# Patient Record
Sex: MALE | Race: WHITE | NOT HISPANIC OR LATINO
[De-identification: names, ages, dates, MRNs, and addresses within clinical notes are randomized per-mention and may not be internally consistent; named-entity substitution may affect disease eponyms.]

---

## 2023-12-26 ENCOUNTER — NON-APPOINTMENT (OUTPATIENT)
Age: 43
End: 2023-12-26

## 2024-01-24 ENCOUNTER — NON-APPOINTMENT (OUTPATIENT)
Age: 44
End: 2024-01-24

## 2024-01-24 ENCOUNTER — APPOINTMENT (OUTPATIENT)
Dept: INTERNAL MEDICINE | Facility: CLINIC | Age: 44
End: 2024-01-24
Payer: COMMERCIAL

## 2024-01-24 VITALS
SYSTOLIC BLOOD PRESSURE: 110 MMHG | RESPIRATION RATE: 15 BRPM | WEIGHT: 172 LBS | DIASTOLIC BLOOD PRESSURE: 70 MMHG | HEIGHT: 66 IN | OXYGEN SATURATION: 98 % | TEMPERATURE: 96.6 F | HEART RATE: 70 BPM | BODY MASS INDEX: 27.64 KG/M2

## 2024-01-24 DIAGNOSIS — Z82.49 FAMILY HISTORY OF ISCHEMIC HEART DISEASE AND OTHER DISEASES OF THE CIRCULATORY SYSTEM: ICD-10-CM

## 2024-01-24 DIAGNOSIS — Z00.00 ENCOUNTER FOR GENERAL ADULT MEDICAL EXAMINATION W/OUT ABNORMAL FINDINGS: ICD-10-CM

## 2024-01-24 DIAGNOSIS — Z23 ENCOUNTER FOR IMMUNIZATION: ICD-10-CM

## 2024-01-24 DIAGNOSIS — F17.210 NICOTINE DEPENDENCE, CIGARETTES, UNCOMPLICATED: ICD-10-CM

## 2024-01-24 DIAGNOSIS — R10.13 EPIGASTRIC PAIN: ICD-10-CM

## 2024-01-24 DIAGNOSIS — R07.89 OTHER CHEST PAIN: ICD-10-CM

## 2024-01-24 PROCEDURE — 93000 ELECTROCARDIOGRAM COMPLETE: CPT | Mod: 59

## 2024-01-24 PROCEDURE — 99204 OFFICE O/P NEW MOD 45 MIN: CPT | Mod: 25

## 2024-01-24 RX ORDER — FAMOTIDINE 40 MG/1
40 TABLET, FILM COATED ORAL
Qty: 90 | Refills: 3 | Status: ACTIVE | COMMUNITY
Start: 2024-01-24 | End: 1900-01-01

## 2024-01-24 NOTE — HEALTH RISK ASSESSMENT
[Excellent] : ~his/her~ current health as excellent [Yes] : Yes [Monthly or less (1 pt)] : Monthly or less (1 point) [1 or 2 (0 pts)] : 1 or 2 (0 points) [No] : In the past 12 months have you used drugs other than those required for medical reasons? No [Never (0 pts)] : Never (0 points) [No falls in past year] : Patient reported no falls in the past year [0] : 2) Feeling down, depressed, or hopeless: Not at all (0) [PHQ-2 Negative - No further assessment needed] : PHQ-2 Negative - No further assessment needed [de-identified] :  walks [Audit-CScore] : 1 [de-identified] : good [HIV test declined] : HIV test declined [Change in mental status noted] : No change in mental status noted [Hepatitis C test declined] : Hepatitis C test declined [Language] : denies difficulty with language [Behavior] : denies difficulty with behavior [Learning/Retaining New Information] : denies difficulty learning/retaining new information [Handling Complex Tasks] : denies difficulty handling complex tasks [Reasoning] : denies difficulty with reasoning [Spatial Ability and Orientation] : denies difficulty with spatial ability and orientation [None] : None [Alone] : lives alone [Employed] : employed [] :  [FreeTextEntry2] : [employer] [Current] : Current [de-identified] : 1/2 - ppd

## 2024-01-24 NOTE — HISTORY OF PRESENT ILLNESS
[FreeTextEntry1] : new pt visit [de-identified] : no health issues remote appy 30+yeara ago  occ chest pain  recent epigastric pain "blockage" bloating, "like a rock sitting in there(pointing to epigastric area) : wife and mother and law had similar diarrhea, nausea

## 2024-01-25 ENCOUNTER — APPOINTMENT (OUTPATIENT)
Dept: CARDIOLOGY | Facility: CLINIC | Age: 44
End: 2024-01-25
Payer: COMMERCIAL

## 2024-01-25 ENCOUNTER — NON-APPOINTMENT (OUTPATIENT)
Age: 44
End: 2024-01-25

## 2024-01-25 ENCOUNTER — APPOINTMENT (OUTPATIENT)
Dept: CARDIOLOGY | Facility: CLINIC | Age: 44
End: 2024-01-25

## 2024-01-25 VITALS
BODY MASS INDEX: 27.48 KG/M2 | HEART RATE: 62 BPM | WEIGHT: 171 LBS | OXYGEN SATURATION: 98 % | DIASTOLIC BLOOD PRESSURE: 74 MMHG | SYSTOLIC BLOOD PRESSURE: 118 MMHG | HEIGHT: 66 IN

## 2024-01-25 DIAGNOSIS — E78.5 HYPERLIPIDEMIA, UNSPECIFIED: ICD-10-CM

## 2024-01-25 PROCEDURE — 93000 ELECTROCARDIOGRAM COMPLETE: CPT

## 2024-01-25 PROCEDURE — 99204 OFFICE O/P NEW MOD 45 MIN: CPT | Mod: 25

## 2024-01-25 RX ORDER — ATORVASTATIN CALCIUM 10 MG/1
10 TABLET, FILM COATED ORAL
Qty: 30 | Refills: 2 | Status: ACTIVE | COMMUNITY
Start: 2024-01-25 | End: 1900-01-01

## 2024-01-28 NOTE — REASON FOR VISIT
[CV Risk Factors and Non-Cardiac Disease] : CV risk factors and non-cardiac disease [Coronary Artery Disease] : coronary artery disease [FreeTextEntry3] : dr reyes [FreeTextEntry1] :  Brendan Stock comes today for evaluation. He was advised to undergo a complete cardiac evaluation. He denies shortness of breath or loss of consciousness but has noted chest pains. Brendan would like to understand his personal cardiovascular risk. He has risk in the form of family history and smoking. He underwent an EKG and was instructed undergo a cardiac evaluation and even a stress test. 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 his mother underwent open-heart surgery his uncle had heart failure in his 50s and his brother has heart problems as well.

## 2024-01-28 NOTE — PHYSICAL EXAM
[Well Developed] : well developed [Well Nourished] : well nourished [No Acute Distress] : no acute distress [Normal Conjunctiva] : normal conjunctiva [Normal Venous Pressure] : normal venous pressure [No Carotid Bruit] : no carotid bruit [Normal S1, S2] : normal S1, S2 [No Murmur] : no murmur [No Rub] : no rub [No Gallop] : no gallop [Clear Lung Fields] : clear lung fields [Good Air Entry] : good air entry [No Respiratory Distress] : no respiratory distress  [Soft] : abdomen soft [Non Tender] : non-tender [No Masses/organomegaly] : no masses/organomegaly [Normal Bowel Sounds] : normal bowel sounds [Normal Gait] : normal gait [No Edema] : no edema [No Cyanosis] : no cyanosis [No Clubbing] : no clubbing [No Varicosities] : no varicosities [No Rash] : no rash [Moves all extremities] : moves all extremities [No Skin Lesions] : no skin lesions [No Focal Deficits] : no focal deficits [Normal Speech] : normal speech [Alert and Oriented] : alert and oriented [Normal memory] : normal memory Infliximab Counseling:  I discussed with the patient the risks of infliximab including but not limited to myelosuppression, immunosuppression, autoimmune hepatitis, demyelinating diseases, lymphoma, and serious infections.  The patient understands that monitoring is required including a PPD at baseline and must alert us or the primary physician if symptoms of infection or other concerning signs are noted.

## 2024-01-28 NOTE — ASSESSMENT
[FreeTextEntry1] : I have asked Brendan to undergo detailed cardiac testing in order to evaluate his overall cardiovascular risk. An assessment of both structural and functional heart disease was recommended to the patient. In this regard, a stress test and cardiac CTA were advised to the patient. I await the upcoming noninvasive cardiac testing in order to assess her overall cardiovascular risk. We discussed the pros and cons of plain treadmill stress testing nuclear stress testing and angiography including a sensitivity analysis. We discussed current ACC guidelines, and the calculated 10-year risk is approximately 9.2%. which is borderline elevated. This represents a high amount of medical decision making based upon two or more chronic illnesses.     More than half of the face-to-face encounter of   60 minutes was spent in counseling the patient with respect to cardiovascular risk.  Quality measures  Tobacco intervention   indicated Statin for prevention of cardiovascular disease       indicated Hypertension compensated. Aspirin for ischemic vascular disease not indicated. Tobacco screening cessation and intervention indicated.  Medical necessity This is a high encounter based upon two or more chronic illnesses with mild exacerbation requiring further management and evaluation.  .  EKG is indicated for evaluation of ashd  this patient has a borderline high risk for major adverse cardiac events based on [place] risk.  risks benefits alternatives were discussed with the patient. all questions were answered to his satisfaction

## 2024-01-30 LAB
25(OH)D3 SERPL-MCNC: 20.1 NG/ML
ALBUMIN SERPL ELPH-MCNC: 4.6 G/DL
ALP BLD-CCNC: 78 U/L
ALT SERPL-CCNC: 31 U/L
ANION GAP SERPL CALC-SCNC: 11 MMOL/L
APPEARANCE: CLEAR
AST SERPL-CCNC: 21 U/L
BACTERIA: NEGATIVE /HPF
BILIRUB SERPL-MCNC: 0.5 MG/DL
BILIRUBIN URINE: NEGATIVE
BLOOD URINE: NEGATIVE
BUN SERPL-MCNC: 12 MG/DL
CALCIUM SERPL-MCNC: 9 MG/DL
CAST: 0 /LPF
CHLORIDE SERPL-SCNC: 102 MMOL/L
CHOLEST SERPL-MCNC: 179 MG/DL
CO2 SERPL-SCNC: 27 MMOL/L
COLOR: YELLOW
CREAT SERPL-MCNC: 0.71 MG/DL
CRP SERPL HS-MCNC: 1.33 MG/L
EGFR: 116 ML/MIN/1.73M2
EPITHELIAL CELLS: 0 /HPF
ERYTHROCYTE [SEDIMENTATION RATE] IN BLOOD BY WESTERGREN METHOD: 2 MM/HR
ESTIMATED AVERAGE GLUCOSE: 108 MG/DL
GLUCOSE QUALITATIVE U: NEGATIVE MG/DL
GLUCOSE SERPL-MCNC: 103 MG/DL
HBA1C MFR BLD HPLC: 5.4 %
HDLC SERPL-MCNC: 43 MG/DL
KETONES URINE: NEGATIVE MG/DL
LDLC SERPL CALC-MCNC: 116 MG/DL
LEUKOCYTE ESTERASE URINE: NEGATIVE
MICROSCOPIC-UA: NORMAL
NITRITE URINE: NEGATIVE
NONHDLC SERPL-MCNC: 136 MG/DL
PH URINE: 6
POTASSIUM SERPL-SCNC: 4.5 MMOL/L
PROT SERPL-MCNC: 6.5 G/DL
PROTEIN URINE: NEGATIVE MG/DL
PSA SERPL-MCNC: 0.65 NG/ML
RED BLOOD CELLS URINE: 1 /HPF
SODIUM SERPL-SCNC: 140 MMOL/L
SPECIFIC GRAVITY URINE: 1.02
TRIGL SERPL-MCNC: 113 MG/DL
TSH SERPL-ACNC: 0.89 UIU/ML
UROBILINOGEN URINE: 0.2 MG/DL
WHITE BLOOD CELLS URINE: 0 /HPF

## 2024-02-12 ENCOUNTER — OUTPATIENT (OUTPATIENT)
Dept: OUTPATIENT SERVICES | Facility: HOSPITAL | Age: 44
LOS: 1 days | End: 2024-02-12
Payer: COMMERCIAL

## 2024-02-12 ENCOUNTER — APPOINTMENT (OUTPATIENT)
Dept: CT IMAGING | Facility: CLINIC | Age: 44
End: 2024-02-12
Payer: COMMERCIAL

## 2024-02-12 DIAGNOSIS — I25.10 ATHEROSCLEROTIC HEART DISEASE OF NATIVE CORONARY ARTERY WITHOUT ANGINA PECTORIS: ICD-10-CM

## 2024-02-12 DIAGNOSIS — R07.89 OTHER CHEST PAIN: ICD-10-CM

## 2024-02-12 PROCEDURE — 75574 CT ANGIO HRT W/3D IMAGE: CPT | Mod: 26

## 2024-02-12 PROCEDURE — 75574 CT ANGIO HRT W/3D IMAGE: CPT

## 2024-02-14 ENCOUNTER — NON-APPOINTMENT (OUTPATIENT)
Age: 44
End: 2024-02-14

## 2024-03-01 ENCOUNTER — APPOINTMENT (OUTPATIENT)
Dept: CARDIOLOGY | Facility: CLINIC | Age: 44
End: 2024-03-01

## 2024-04-04 ENCOUNTER — APPOINTMENT (OUTPATIENT)
Dept: PULMONOLOGY | Facility: CLINIC | Age: 44
End: 2024-04-04
Payer: COMMERCIAL

## 2024-04-04 VITALS
SYSTOLIC BLOOD PRESSURE: 100 MMHG | OXYGEN SATURATION: 98 % | DIASTOLIC BLOOD PRESSURE: 70 MMHG | HEART RATE: 68 BPM | RESPIRATION RATE: 16 BRPM

## 2024-04-04 VITALS — BODY MASS INDEX: 28.28 KG/M2 | HEIGHT: 66 IN | WEIGHT: 176 LBS

## 2024-04-04 DIAGNOSIS — R06.02 SHORTNESS OF BREATH: ICD-10-CM

## 2024-04-04 PROCEDURE — 99204 OFFICE O/P NEW MOD 45 MIN: CPT

## 2024-04-04 PROCEDURE — 99406 BEHAV CHNG SMOKING 3-10 MIN: CPT

## 2024-04-04 RX ORDER — NICOTINE 4 MG/1
10 INHALANT RESPIRATORY (INHALATION)
Qty: 1 | Refills: 3 | Status: ACTIVE | COMMUNITY
Start: 2024-04-04 | End: 1900-01-01

## 2024-04-04 RX ORDER — ALBUTEROL SULFATE 90 UG/1
108 (90 BASE) INHALANT RESPIRATORY (INHALATION)
Qty: 1 | Refills: 5 | Status: ACTIVE | COMMUNITY
Start: 2024-04-04 | End: 1900-01-01

## 2024-04-04 NOTE — HISTORY OF PRESENT ILLNESS
[Current] : current [TextBox_4] : 43M PMH smoker, COVID-19 (2020), HLD, GERD who presents for initial pulmonary evaluation. Had recent CT coronary showing B/L mild bronchial thickening and lower lobe mosaic attenuation. 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 He smokes about 1ppd for the past 20 years. He was able to quit for 1.5 years in the past - what had helped was pre-occupying his mind with video games. Now he feels he smokes out of habit and out of boredom. He reports some chest tightness at times, as well as easy fatigability. Does have productive cough frequently.  [TextBox_11] : 1 [TextBox_13] : 20

## 2024-04-04 NOTE — RESULTS/DATA
[TextEntry] : [facility] 	 EXAM: [id number] - CT ANGIO HEART CORONARY IC  - ORDERED BY: FERMIN STERLING   PROCEDURE DATE:  2024    INTERPRETATION:  CT ANGIO HEART WITH IV CONTRAST   INDICATION: Chest pain, rule out coronary artery disease.   COMPARISON: None.   TECHNIQUE: Unenhanced prospective and enhanced retrospective images were obtained of the heart. Images were obtained using a Siemens Somatom Force. Images were obtained before and after the uneventful administration of nonionic intravenous contrast. The patient was imaged using a body mass index protocol, automatic exposure control, and iterative reconstruction. Curved multiplanar images and 3-D reformatted images were generated using an independent software program.   CONTRAST VOLUME: Isovue 370. 72 cc administered. 28 cc discarded.   MEDICATION:  0.8 mg SL nitroglycerin.  2.5 mg IV metoprolol.   DOSE: 521 mGy.cm.   HEART RATE: Average 55 BPM during acquisition.   QUALITY:  Good.   FINDINGS:   CALCIUM SCORE: The calculated Agatston score is 1.  Agatston Score: Left main: 1. Left anterior descendin. Left circumflex: 0. Right coronary: 0.  0: No plaque is present. 1-10: A minimal amount of plaque is present. : A mild amount of plaque is present. 101-400: A moderate amount of plaque is present. Greater than 400: A large amount of plaque is present.  CORONARY: Right coronary artery dominance. No evidence for anomalous coronary artery origin.  LEFT MAIN: Ostial tiny alirio of calcium without measurable stenosis.  LEFT ANTERIOR DESCENDING: No significant stenosis. Proximal LAD minimal category disease secondary to noncalcified plaque mid LAD mild category disease secondary to noncalcified plaque. Distal LAD minimal category disease secondary to noncalcified plaque. Distal LAD wraps around the apex. First diagonal artery with multifocal noncalcified plaque resulting in minimal to mild category disease. Remaining diagonal arteries are too small to characterize.  RAMUS INTERMEDIUS: Medium caliber only branching vessel with scattered minimal stenosis secondary to noncalcified plaque.  LEFT CIRCUMFLEX: No significant stenosis. Scattered minimal stenosis secondary to noncalcified plaque. First obtuse marginal arteries to small to characterize. Second obtuse marginal artery is small in caliber. Circumflex artery terminates as a third obtuse marginal artery.  RIGHT CORONARY ARTERY:  No significant stenosis. Proximal RCA mild category disease secondary to noncalcified plaque by diameter. Mid and distal RCA minimal category disease secondary to noncalcified plaque. PDA and posterolateral branches are identified. This  HEART AND OTHER VESSELS: Heart size is qualitatively within normal limits. No valvular calcification. No left atrial appendage thrombus. Trace pericardial fluid, also noted along a superior pericardial recess. Imaged thoracic aorta is normal in caliber. Aortic arch is not fully imaged. Main pulmonary artery size is within normal limits.  NONCARDIAC FINDINGS: Central airways are patent. Bilateral mild bronchial thickening and lower lobe mosaic attenuation may be due to air trapping. Correlate for any respiratory symptoms. No visualized pleural effusion no enlarged lymph nodes of the imaged thorax. Visualized esophagus is nondistended. Calcified density within the partially imaged splenic flexure may represent a fecalith. Degenerative changes of the visualized bones.  IMPRESSION:  Agatston calcium score: 1.  Coronary CTA: Right coronary artery dominance. No significant moderate to severe category coronary artery stenosis. Scattered minimal to mild category disease secondary to noncalcified plaque as described above. Tiny alirio of calcified plaque at the left main ostium is without measurable stenosis.  Stenosis Reference Ranges: Minimal <25% Mild 25-49% Moderate 50-69% Severe 70-99% Occluded >99%  --- End of Report ---       JERRELL GUIDO M.D., ATTENDING RADIOLOGIST This document has been electronically signed. 2024  9:38AM  ~~~~~~~~~~~~~~~~~~~~~~~~~~~~~~~~~~~~~~~~~~~~~~~~~~~~~~~~~~~~~~~~~~~~~~~~

## 2024-04-04 NOTE — REASON FOR VISIT
[Initial] : an initial visit [Abnormal CXR/ Chest CT] : an abnormal CXR/ chest CT [Nicotine Dependence] : nicotine dependence

## 2024-05-14 ENCOUNTER — APPOINTMENT (OUTPATIENT)
Dept: GASTROENTEROLOGY | Facility: CLINIC | Age: 44
End: 2024-05-14

## 2024-05-31 ENCOUNTER — APPOINTMENT (OUTPATIENT)
Dept: GASTROENTEROLOGY | Facility: CLINIC | Age: 44
End: 2024-05-31

## 2024-06-10 ENCOUNTER — APPOINTMENT (OUTPATIENT)
Dept: PULMONOLOGY | Facility: CLINIC | Age: 44
End: 2024-06-10

## 2024-08-07 ENCOUNTER — NON-APPOINTMENT (OUTPATIENT)
Age: 44
End: 2024-08-07

## 2024-11-11 ENCOUNTER — NON-APPOINTMENT (OUTPATIENT)
Age: 44
End: 2024-11-11

## 2025-01-24 ENCOUNTER — NON-APPOINTMENT (OUTPATIENT)
Age: 45
End: 2025-01-24

## 2025-01-26 ENCOUNTER — NON-APPOINTMENT (OUTPATIENT)
Age: 45
End: 2025-01-26

## 2025-01-27 ENCOUNTER — NON-APPOINTMENT (OUTPATIENT)
Age: 45
End: 2025-01-27